# Patient Record
Sex: FEMALE | URBAN - METROPOLITAN AREA
[De-identification: names, ages, dates, MRNs, and addresses within clinical notes are randomized per-mention and may not be internally consistent; named-entity substitution may affect disease eponyms.]

---

## 2021-04-09 ENCOUNTER — APPOINTMENT (RX ONLY)
Dept: URBAN - METROPOLITAN AREA CLINIC 1 | Facility: CLINIC | Age: 26
Setting detail: DERMATOLOGY
End: 2021-04-09

## 2021-04-09 DIAGNOSIS — Z41.9 ENCOUNTER FOR PROCEDURE FOR PURPOSES OTHER THAN REMEDYING HEALTH STATE, UNSPECIFIED: ICD-10-CM

## 2021-04-09 PROCEDURE — ? JUVEDERM ULTRA INJECTION

## 2021-04-09 NOTE — PROCEDURE: JUVEDERM ULTRA INJECTION
Brows Filler Volume In Cc: 0
Detail Level: Detailed
Use Map Statement For Sites (Optional): No
Lot #: [id number]
Post-Care Instructions: Patient instructed to apply ice to reduce swelling.
Anesthesia Type: 1% lidocaine with epinephrine
Additional Area 2 Location: central lower and upper lip
Filler: Juvederm Ultra
Additional Area 2 Volume In Cc: 0.5
Additional Anesthesia Volume In Cc: 6
Procedural Text: The filler was administered to the treatment areas noted above.
Additional Area 1 Location: chin
Additional Area 3 Location: lip lines
Number Of Syringes (Required For Inventory): 1
Expiration Date (Month Year): 12/21
Map Statement: See Attach Map for Complete Details
Price (Use Numbers Only, No Special Characters Or $): 118
Consent: Written consent obtained. Risks include but not limited to bruising, beading, irregular texture, ulceration, infection, allergic reaction, scar formation, incomplete augmentation, temporary nature, procedural pain.

## 2021-08-18 ENCOUNTER — APPOINTMENT (RX ONLY)
Dept: URBAN - METROPOLITAN AREA CLINIC 1 | Facility: CLINIC | Age: 26
Setting detail: DERMATOLOGY
End: 2021-08-18

## 2021-08-18 DIAGNOSIS — Z41.9 ENCOUNTER FOR PROCEDURE FOR PURPOSES OTHER THAN REMEDYING HEALTH STATE, UNSPECIFIED: ICD-10-CM

## 2021-08-18 PROCEDURE — ? JUVEDERM ULTRA INJECTION

## 2021-08-18 ASSESSMENT — LOCATION DETAILED DESCRIPTION DERM
LOCATION DETAILED: LEFT SUPERIOR VERMILION LIP
LOCATION DETAILED: LEFT INFERIOR VERMILION BORDER
LOCATION DETAILED: RIGHT SUPERIOR VERMILION LIP
LOCATION DETAILED: RIGHT INFERIOR VERMILION LIP
LOCATION DETAILED: LEFT INFERIOR VERMILION LIP

## 2021-08-18 ASSESSMENT — LOCATION ZONE DERM: LOCATION ZONE: LIP

## 2021-08-18 ASSESSMENT — LOCATION SIMPLE DESCRIPTION DERM
LOCATION SIMPLE: LEFT LOWER LIP
LOCATION SIMPLE: LEFT LIP
LOCATION SIMPLE: RIGHT LIP

## 2021-08-18 NOTE — PROCEDURE: JUVEDERM ULTRA INJECTION
Additional Area 1 Location: chin
Number Of Syringes (Required For Inventory): 1
Expiration Date (Month Year): 04/22
[place] Filler Volume In Cc: 0
Additional Area 3 Location: lip lines
Anesthesia Volume In Cc: 0.5
Map Statement: See Attach Map for Complete Details
Procedural Text: The filler was administered to the treatment areas noted above.
Detail Level: Detailed
Use Map Statement For Sites (Optional): No
Consent: Written consent obtained. Risks include but not limited to bruising, beading, irregular texture, ulceration, infection, allergic reaction, scar formation, incomplete augmentation, temporary nature, procedural pain.
Additional Area 2 Location: central upper lip. lower
Filler: Juvederm Ultra
Lot #: [id number]
Post-Care Instructions: Patient instructed to apply ice to reduce swelling.
Anesthesia Type: 1% lidocaine with epinephrine
Additional Anesthesia Volume In Cc: 6
Price (Use Numbers Only, No Special Characters Or $): 395